# Patient Record
Sex: MALE | Race: WHITE
[De-identification: names, ages, dates, MRNs, and addresses within clinical notes are randomized per-mention and may not be internally consistent; named-entity substitution may affect disease eponyms.]

---

## 2022-09-11 ENCOUNTER — HOSPITAL ENCOUNTER (EMERGENCY)
Dept: HOSPITAL 33 - ED | Age: 22
Discharge: HOME | End: 2022-09-11
Payer: COMMERCIAL

## 2022-09-11 VITALS — DIASTOLIC BLOOD PRESSURE: 92 MMHG | HEART RATE: 97 BPM | OXYGEN SATURATION: 99 % | SYSTOLIC BLOOD PRESSURE: 147 MMHG

## 2022-09-11 DIAGNOSIS — M25.571: ICD-10-CM

## 2022-09-11 DIAGNOSIS — S90.01XA: Primary | ICD-10-CM
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
## 2022-09-11 DIAGNOSIS — Z28.310: ICD-10-CM
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
## 2022-09-11 PROCEDURE — 99283 EMERGENCY DEPT VISIT LOW MDM: CPT

## 2022-09-11 PROCEDURE — 73610 X-RAY EXAM OF ANKLE: CPT

## 2022-09-11 NOTE — ERPHSYRPT
- History of Present Illness


Time Seen by Provider: 09/11/22 05:00


Source: patient


Exam Limitations: no limitations


Patient Subjective Stated Complaint: pt states "I was in a softball tournament 

and it just got done at 0400. I was pitching and a softball came and hit me in 

the ankle."


Triage Nursing Assessment: Pt ambulatory to bed by self, pt alert and oriented 

x3, pt c/o R ankle pain after playing in a softball tournament, pt was pitching 

and got hit in the ankle by a softball, R ankle swollen, no bruising at this 

time, pt bilateral +2 pedal pulses, cap refill less than 2 secs bilateral


Physician History: 





This is a 22-year-old white male patient who was pitching at a softball game.  

The game completed prior to arrival to the emergency department.  At 1 point, 

the patient was pitching in the ball was hit and came back and hit him in the 

right ankle.  Patient arrives to emergency department with a swollen tender 

right ankle.


Method of Injury: sports injury


Occurred: just prior to arrival


Quality: constant, aching


Severity of Pain-Max: moderate


Severity of Pain-Current: moderate


Lower Extremities Pain: ankle: right


Modifying Factors: Improves With: movement


Associated Symptoms: other (Hurts to bear weight but can do so)


Allergies/Adverse Reactions: 








No Known Drug Allergies Allergy (Verified 09/11/22 04:36)


   





Home Medications: 








No Reportable Medications [No Reported Medications]  09/11/22 [History]





Hx Tetanus, Diphtheria Vaccination/Date Given: No


Hx Influenza Vaccination/Date Given: No


Hx Pneumococcal Vaccination/Date Given: No


Immunizations Up to Date: No





Travel Risk





- International Travel


Have you traveled outside of the country in past 3 weeks: No





- Coronavirus Screening


Are you exhibiting any of the following symptoms?: No


Close contact with a COVID-19 positive Pt in past 14-21 Days: No





- Vaccine Status


Have you recieved a Covid-19 vaccination: No





- Review of Systems


Constitutional: No Symptoms


Eyes: No Symptoms


Ears, Nose, & Throat: No Symptoms


Respiratory: No Symptoms


Cardiac: No Symptoms


Abdominal/Gastrointestinal: No Symptoms


Genitourinary Symptoms: No Symptoms


Musculoskeletal: Injury (Right ankle)


Skin: No Symptoms


Neurological: No Symptoms


Psychological: No Symptoms


Endocrine: No Symptoms


Hematologic/Lymphatic: No Symptoms


Immunological/Allergic: No Symptoms


All Other Systems: Reviewed and Negative





- Past Medical History


Pertinent Past Medical History: Yes


Neurological History: No Pertinent History


ENT History: No Pertinent History


Cardiac History: No Pertinent History


Respiratory History: No Pertinent History


Endocrine Medical History: No Pertinent History


Musculoskeletal History: No Pertinent History


GI Medical History: Other


 History: No Pertinent History


Psycho-Social History: No Pertinent History


Male Reproductive Disorders: No Pertinent History


Other Medical History: bowel obstruction





- Past Surgical History


Past Surgical History: Yes


Neuro Surgical History: No Pertinent History


Cardiac: No Pertinent History


Respiratory: No Pertinent History


Gastrointestinal: Appendectomy, Bowel Surgery


Genitourinary: No Pertinent History


Musculoskeletal: No Pertinent History


Male Surgical History: No Pertinent History





- Social History


Smoking Status: Never smoker


Exposure to second hand smoke: No


Drug Use: none


Patient Lives Alone: No





- Nursing Vital Signs


Nursing Vital Signs: 


                               Initial Vital Signs











Temperature  97.7 F   09/11/22 04:37


 


Pulse Rate  118 H  09/11/22 04:37


 


Respiratory Rate  18   09/11/22 04:37


 


Blood Pressure  137/95   09/11/22 04:37


 


O2 Sat by Pulse Oximetry  97   09/11/22 04:37








                                   Pain Scale











Pain Intensity                 6

















- Physical Exam


General Appearance: no apparent distress, alert, anxiety


Eyes, Ears, Nose, Throat Exam: normal ENT inspection, moist mucous membranes


Neck Exam: normal inspection, non-tender, supple, full range of motion


Cardiovascular/Respiratory Exam: chest non-tender, no respiratory distress


Gastrointestinal/Abdominal Exam: non-tender


Back Exam: normal inspection, normal range of motion, No CVA tenderness, No 

vertebral tenderness


Hips Exam: bilateral: non-tender, normal inspection, normal range of motion, no 

evidence of injury


Legs Exam: bilateral leg: non-tender, normal inspection, normal range of motion,

 no evidence of injury


Knees Exam: bilateral knee: non-tender, normal inspection, normal range of 

motion, no evidence of injury


Ankle Exam: right ankle: bone tenderness, pain, soft tissue tenderness, 

swelling, left ankle: non-tender, normal inspection, normal range of motion, no 

evidence of injury


Foot Exam: bilateral foot: non-tender, normal inspection, normal range of 

motion, no evidence of injury


Neuro/Tendon Exam: normal sensation, normal motor functions, normal tendon 

functions, responds to pain, no evidence tendon injury


Mental Status Exam: alert, oriented x 3, cooperative


Skin Exam: normal color, warm, dry


**SpO2 Interpretation**: normal


SpO2: 97


O2 Delivery: Room Air





- Course


Nursing assessment & vital signs reviewed: Yes


Ordered Tests: 


                               Active Orders 24 hr











 Category Date Time Status


 


 Ace Bandage Application -[facility] STAT Care  09/11/22 05:29 Ordered


 


 ANKLE (3 VIEWS) Stat Exams  09/11/22 04:59 Taken








Medication Summary














Discontinued Medications














Generic Name Dose Route Start Last Admin





  Trade Name Anabel  PRN Reason Stop Dose Admin


 


Hydrocodone Bitart/Acetaminophen  1 tab  09/11/22 05:29 





  Hydrocodone/Apap 5/325 Mg Tablet  PO  09/11/22 05:30 





  STAT ONE  


 


Ibuprofen  600 mg  09/11/22 05:29 





  Ibuprofen 600 Mg Tablet  PO  09/11/22 05:30 





  STAT ONE  














- Progress


Progress: unchanged


Progress Note: 





09/11/22 05:31


X-ray right ankle shows no acute fracture or dislocation.


Counseled pt/family regarding: diagnosis, need for follow-up, rad results





- Departure


Departure Disposition: Home


Clinical Impression: 


 Contusion of right ankle





Condition: Stable


Critical Care Time: No


Referrals: 


TOM ROSE [Primary Care Provider] - Follow up/PCP as directed


Additional Instructions: 


Ice pack to area 3 times a day for the next 48 hours.  Add ibuprofen 600 mg 

orally with food 3 times a day for the next 4 days.  After you complete your 

Norco medication, add Tylenol 650 mg orally 3 times a day.  Follow-up with your 

primary care provider or [facility] orthopedic clinic or Dr. Damon 

(podiatry) for persistent pain and swelling

## 2022-09-11 NOTE — XRAY
Indication: Pain following softball injury.



Comparison: None



3 view right ankle obtained.  No bony, articular, or soft tissue abnormalities.